# Patient Record
Sex: MALE | Race: WHITE | NOT HISPANIC OR LATINO | URBAN - METROPOLITAN AREA
[De-identification: names, ages, dates, MRNs, and addresses within clinical notes are randomized per-mention and may not be internally consistent; named-entity substitution may affect disease eponyms.]

---

## 2021-05-09 ENCOUNTER — EMERGENCY (EMERGENCY)
Facility: HOSPITAL | Age: 31
LOS: 1 days | Discharge: HOME | End: 2021-05-09
Attending: EMERGENCY MEDICINE | Admitting: EMERGENCY MEDICINE
Payer: COMMERCIAL

## 2021-05-09 VITALS
HEIGHT: 72 IN | SYSTOLIC BLOOD PRESSURE: 124 MMHG | DIASTOLIC BLOOD PRESSURE: 84 MMHG | RESPIRATION RATE: 16 BRPM | WEIGHT: 190.04 LBS | TEMPERATURE: 98 F | OXYGEN SATURATION: 98 % | HEART RATE: 76 BPM

## 2021-05-09 DIAGNOSIS — H20.9 UNSPECIFIED IRIDOCYCLITIS: ICD-10-CM

## 2021-05-09 PROCEDURE — 99283 EMERGENCY DEPT VISIT LOW MDM: CPT

## 2021-05-09 RX ORDER — ERYTHROMYCIN BASE 5 MG/GRAM
1 OINTMENT (GRAM) OPHTHALMIC (EYE) ONCE
Refills: 0 | Status: COMPLETED | OUTPATIENT
Start: 2021-05-09 | End: 2021-05-09

## 2021-05-09 RX ORDER — CYCLOPENTOLATE HYDROCHLORIDE 10 MG/ML
1 SOLUTION/ DROPS OPHTHALMIC ONCE
Refills: 0 | Status: COMPLETED | OUTPATIENT
Start: 2021-05-09 | End: 2021-05-09

## 2021-05-09 RX ADMIN — Medication 1 APPLICATION(S): at 04:20

## 2021-05-09 RX ADMIN — CYCLOPENTOLATE HYDROCHLORIDE 1 DROP(S): 10 SOLUTION/ DROPS OPHTHALMIC at 03:05

## 2021-05-09 NOTE — ED PROVIDER NOTE - PHYSICAL EXAMINATION
VITAL SIGNS: I have reviewed nursing notes and confirm.  CONSTITUTIONAL: Well-developed; well-nourished; in no acute distress.  SKIN: Skin exam is warm and dry, no acute rash.  HEAD: Normocephalic; atraumatic.  EYES: PERRL, EOM intact; + conjunctival injection b/l, + mild haziness of b/l anterior chambers, no uptake of fluorescein b/l, negative sidell's sign.  ENT: No nasal discharge; airway clear.  NECK: Supple; non tender.  CARD: S1, S2 normal; no murmurs, gallops, or rubs. Regular rate and rhythm.  RESP: Unlabored. No wheezes, rales or rhonchi.  ABD: soft; non-distended; non-tender  EXT: Normal ROM. No cyanosis or edema. Non-ttp all ext, distal pulses intact  NEURO: Alert, oriented. Grossly unremarkable.  PSYCH: Cooperative, appropriate.

## 2021-05-09 NOTE — ED ADULT TRIAGE NOTE - CHIEF COMPLAINT QUOTE
bilateral eye irritation since 2 pm - after application contacts and removed them 15 mins later- bilateral sclera redenned, tearing, painful w/ photosensitivity

## 2021-05-09 NOTE — ED PROVIDER NOTE - OBJECTIVE STATEMENT
31 y/o M contact lens wearer, has been wearing glasses for past several days until this evening when he attempted to put in an old pair of contacts which had been sitting in solution for approximately 1 wk on his bathroom counter and developed a burning pain almost immediately in both eyes. He removed the contacts but pain has persisted. + endorsing painful photophobia, excessive tearing and eye redness. No similar events in the past.

## 2021-05-09 NOTE — ED PROVIDER NOTE - CLINICAL SUMMARY MEDICAL DECISION MAKING FREE TEXT BOX
Sx likely 2/2 chemical irritant leading to inflammation in the anterior chamber, improved with tetracaine and cyclopentolate. Will d/c home with abx drops and cycloplegics, f/u with ophthalmology. Given return precautions.

## 2021-05-09 NOTE — ED ADULT NURSE NOTE - CHIEF COMPLAINT QUOTE
bilateral eye irritation since 2 pm - after application contacts and removed them 15 mins later- bilateral sclera redenned, tearing, painful w/ photosensitivity
Transportation service/cab

## 2021-05-09 NOTE — ED PROVIDER NOTE - NSFOLLOWUPINSTRUCTIONS_ED_ALL_ED_FT
Iritis    WHAT YOU NEED TO KNOW:    Iritis is inflammation of your iris. The iris is the colored part of your eye.    Eye Anatomy         DISCHARGE INSTRUCTIONS:    Call your doctor or ophthalmologist if:   •You have severe eye pain and a headache.      •Your vision suddenly gets worse.      •You have nausea or are vomiting.      •Your pain gets worse, even after treatment.      •You see halos or rainbows around lights.      •You have questions or concerns about your condition or care.      Medicines: You may need any of the following:   •Cycloplegic eyedrops dilate your pupil and relax your eye muscles. This helps decrease pain and light sensitivity.      •Steroid eyedrops help decrease pain and inflammation. These are only used for a short time to relieve the inflammation. You may be given steroid medicine as pills if the cause of your iritis is not an infection.      •Acetaminophen decreases pain and fever. It is available without a doctor's order. Ask how much to take and how often to take it. Follow directions. Read the labels of all other medicines you are using to see if they also contain acetaminophen, or ask your doctor or pharmacist. Acetaminophen can cause liver damage if not taken correctly. Do not use more than 4 grams (4,000 milligrams) total of acetaminophen in one day.       •Take your medicine as directed. Contact your healthcare provider if you think your medicine is not helping or if you have side effects. Tell him of her if you are allergic to any medicine. Keep a list of the medicines, vitamins, and herbs you take. Include the amounts, and when and why you take them. Bring the list or the pill bottles to follow-up visits. Carry your medicine list with you in case of an emergency.      Manage iritis:   •Apply a warm compress to your eye. Wet a washcloth in warm water and wring it out. Place it gently over your eye for 20 minutes 3 to 4 times each day. This will help soothe your eye and decrease inflammation.      •Wear dark sunglasses. This will help prevent pain and light sensitivity. Make sure the sunglasses have UVA and UVB protection. This will protect your eyes when you go outside.      •Use eyedrops safely. If your treatment plan includes eyedrops, it is important to use them as directed. Your provider may give you detailed instructions to follow. The eyedrops may also come with safety instructions. Follow all instructions to help prevent an infection. Do not touch the tip of the bottle to your eye. Germs from your eye can spread to the medicine bottle.  Steps 1 2 3 4           Follow up with your doctor or ophthalmologist within 24 hours: Write down your questions so you remember to ask them during your visits.

## 2025-03-03 NOTE — ED PROVIDER NOTE - PATIENT PORTAL LINK FT
Informed patient an order was placed to [facility].    You can access the FollowMyHealth Patient Portal offered by [facility] by registering at the following website: [email or web address]. By joining [facility]’s FollowMyHealth portal, you will also be able to view your health information using other applications (apps) compatible with our system.